# Patient Record
Sex: FEMALE | Race: OTHER | NOT HISPANIC OR LATINO | URBAN - METROPOLITAN AREA
[De-identification: names, ages, dates, MRNs, and addresses within clinical notes are randomized per-mention and may not be internally consistent; named-entity substitution may affect disease eponyms.]

---

## 2020-08-20 ENCOUNTER — EMERGENCY (EMERGENCY)
Facility: HOSPITAL | Age: 75
LOS: 1 days | Discharge: HOME | End: 2020-08-20
Attending: EMERGENCY MEDICINE | Admitting: EMERGENCY MEDICINE
Payer: COMMERCIAL

## 2020-08-20 VITALS
OXYGEN SATURATION: 98 % | SYSTOLIC BLOOD PRESSURE: 203 MMHG | HEART RATE: 88 BPM | HEIGHT: 60 IN | WEIGHT: 164.91 LBS | TEMPERATURE: 99 F | RESPIRATION RATE: 18 BRPM | DIASTOLIC BLOOD PRESSURE: 85 MMHG

## 2020-08-20 VITALS — SYSTOLIC BLOOD PRESSURE: 180 MMHG | DIASTOLIC BLOOD PRESSURE: 76 MMHG

## 2020-08-20 PROCEDURE — 99283 EMERGENCY DEPT VISIT LOW MDM: CPT

## 2020-08-20 PROCEDURE — 99284 EMERGENCY DEPT VISIT MOD MDM: CPT

## 2020-08-20 NOTE — ED PROVIDER NOTE - PATIENT PORTAL LINK FT
You can access the FollowMyHealth Patient Portal offered by [facility] by registering at the following website: [email or web address]. By joining [employer]’s FollowMyHealth portal, you will also be able to view your health information using other applications (apps) compatible with our system.

## 2020-08-20 NOTE — ED PROVIDER NOTE - PHYSICAL EXAMINATION
Constitutional: WDWN resting comfortably in bed; NAD  Head: NC/AT  Eyes: PERRL, EOMI, anicteric sclera  Neck: supple; no JVD or thyromegaly  Respiratory: CTA B/L; no W/R/R, no retractions, no crackles  Cardiac: +S1/S2; RRR; no M/R/G; PMI non-displaced  Gastrointestinal: abdomen soft, NT/ND; no rebound or guarding; +BSx4  Back: Spine midline, no CVAT B/L  Extremities: WWP, no peripheral edema  Vascular: 2+ radial, DP pulses B/L  Dermatologic: no diaphoresis, skin warm, dry and intact; no rashes, wounds, or scars  Neurologic: AAOx3; CNII-XII grossly intact; no focal deficits, strength 5/5 x4 Constitutional: WDWN resting comfortably in bed; NAD  Head: NC/AT  Eyes: PERRL, EOMI, anicteric sclera  Neck: supple; no JVD or thyromegaly  Respiratory: CTA B/L; no W/R/R, no retractions, no crackles  Cardiac: +S1/S2; RRR; no M/R/G; PMI non-displaced  Gastrointestinal: abdomen soft, NT/ND; no rebound or guarding; +BSx4  Back: Spine midline, no CVAT B/L  Extremities: WWP, no peripheral edema  Vascular: 2+ radial, DP pulses B/L  Dermatologic: no diaphoresis, skin warm, dry and intact; no rashes, wounds, or scars  Neurologic: AAOx3; CNII-XII intact; no focal deficits, strength 5/5 x4

## 2020-08-20 NOTE — ED PROVIDER NOTE - NSFOLLOWUPINSTRUCTIONS_ED_ALL_ED_FT
Please return to the hospital if you experience a fever, chills, or difficulty breathing.    During your time in our Emergency Department at [facility] today, your blood pressure was lower than the value you saw on your blood pressure machine at home. Since it would be dangerous to drop your blood pressure too quickly, we did not give you any additional blood pressure medications during your visit. Please contact your Primary Care Physician tomorrow morning and discuss any possible medication changes that he might consider necessary based on your elevated blood pressure at home. Immediately return to the Emergency Department or call 911 for any high fever, trouble breathing, severe vomiting, worsening pain, weakness on one side, or any other concerns.    Please take your medications as directed.   Please call your primary care physician tomorrow for a follow up appointment.     During your time in our Emergency Department at [facility] today, your blood pressure was lower than the value you saw on your blood pressure machine at home. Since it would be dangerous to drop your blood pressure too quickly, we did not give you any additional blood pressure medications during your visit. Please contact your Primary Care Physician tomorrow morning and discuss any possible medication changes that he might consider necessary based on your elevated blood pressure at home.

## 2020-08-20 NOTE — ED ADULT TRIAGE NOTE - OTHER COMPLAINTS
reports increased BP tonight, usually  at home, currently on HCTZ and metoprolol. denies headache, no blurred vision, no CP, no SOB

## 2020-08-20 NOTE — ED PROVIDER NOTE - NS ED ROS FT
CONSTITUTIONAL: No weakness, fevers or chills.   EYES/ENT: No visual changes;  No vertigo or throat pain   NECK: No pain or stiffness  RESPIRATORY: No cough, wheezing, hemoptysis; No shortness of breath  CARDIOVASCULAR: No chest pain or palpitations  GASTROINTESTINAL: No abdominal or epigastric pain. No nausea, vomiting, or hematemesis; No diarrhea or constipation. No melena or hematochezia.  GENITOURINARY: No hematuria  NEUROLOGICAL: No numbness or weakness  All other review of systems is negative unless indicated above.

## 2020-08-20 NOTE — ED PROVIDER NOTE - OBJECTIVE STATEMENT
74F with PMH of HTN presenting with SBP >220 at home measured several times by home BP cuff at 8:30pm this evening. At the time, pt had just had 1 cup of coffee with a piece of bread and was seated comfortably with no acute complaints. Specifically denies any CP, palpitations SOB, abdominal pain, changes in vision/blurred vision, dizziness, slurred speech, jaw pain, back pain, or hematuria. Of note, pt changed from amlodipine to triamterene-HCTZ at her cardiology appointment this past Tuesday 8/18. Pt brought her home BP cuff to the ER today which read 189/71 during examination, consistent with the 184/72 read by bedside cuff.

## 2020-08-20 NOTE — ED PROVIDER NOTE - CLINICAL SUMMARY MEDICAL DECISION MAKING FREE TEXT BOX
74F with PMH of HTN presenting due to elevated BP reading at home (SBP >220) with no acute symptoms, in the setting of recently changed medications (stopped amlodipine on 8/18, began triamterene-HCTZ). Pt BP is 184/72 on evaluation in ED; no need for anti-hypertensive medications at this time. Plan to discharge patient with instructions to contact PCP tomorrow AM to discuss possible medication adjustments.

## 2020-08-20 NOTE — ED ADULT NURSE NOTE - NSIMPLEMENTINTERV_GEN_ALL_ED
Implemented All Universal Safety Interventions:  [place] to call system. Call bell, personal items and telephone within reach. Instruct patient to call for assistance. Room bathroom lighting operational. Non-slip footwear when patient is off stretcher. Physically safe environment: no spills, clutter or unnecessary equipment. Stretcher in lowest position, wheels locked, appropriate side rails in place.

## 2020-08-20 NOTE — ED PROVIDER NOTE - ATTENDING CONTRIBUTION TO CARE
74F pmh htn p/w home sbp cuff >220 @8:30p, no sx. Switched meds from amlodipine to traimterine hctz Tuesday, taking it daily in evening last dose at 6:30p. Also metoprolol daily. Since then noted elevated bp this evening. Just had coffee prior to checking bp. NO sx, no ha, no n/v, no cp, no vision changes, no unilateral weakness. Exam nontoxic, well appearing, no neuro deficits, no distress. concern htn, unlikely htn emergency, no e/o acute ischemia, not consistent w/ stroke. EKG w/o acute ischemia. Discussed w/ family & pt, advised taking meds daily as written. Feeling much improved, no acute life threatening illness. Pt seeking discharge.  Given return precautions. I have discussed the discharge plan with the parents who both agree with the plan, as discussed.  They understand Emergency Department diagnosis is a preliminary diagnosis often based on limited information and that the patient must adhere to the follow-up plan as discussed.  The parents understands that if the symptoms worsen or if prescribed medications do not have the desired/planned effect that the patient may return to the Emergency Department at any time for further evaluation and treatment.

## 2020-08-20 NOTE — ED ADULT NURSE NOTE - CHPI ED NUR SYMPTOMS NEG
no shortness of breath/no vomiting/no syncope/no congestion/no chest pain/no diaphoresis/no chills/no nausea/no fever/no dizziness/no back pain

## 2020-08-20 NOTE — ED ADULT NURSE NOTE - OBJECTIVE STATEMENT
75yo female co hypertension. Pt  at bedside, pt reports elevated BP initially noted in cardiologist office on 8/18/20- BP found to be around 200s systolically, pt reports baseline in 160s systolically. Pt medications adjusted by cardiologist at time of visit, and pt reports compliance with all meds, however, BP remains 170s-200s systolically and pt presents to ED with concerns about secondary effects. Pt denies CP, back pain, blurry vision, headache,  numbness/tingling, N/V/D, new stress, dizziness. No acute distress noted at this time. EKG in progress.

## 2020-08-24 DIAGNOSIS — I10 ESSENTIAL (PRIMARY) HYPERTENSION: ICD-10-CM
